# Patient Record
(demographics unavailable — no encounter records)

---

## 2024-12-19 NOTE — PHYSICAL EXAM
[No Respiratory Distress] : no respiratory distress  [No Accessory Muscle Use] : no accessory muscle use [Clear to Auscultation] : lungs were clear to auscultation bilaterally [Normal Rate] : normal rate  [Regular Rhythm] : with a regular rhythm [Soft] : abdomen soft [Non Tender] : non-tender [Non-distended] : non-distended [Coordination Grossly Intact] : coordination grossly intact [No Focal Deficits] : no focal deficits [Normal] : affect was normal and insight and judgment were intact

## 2024-12-19 NOTE — HISTORY OF PRESENT ILLNESS
[FreeTextEntry8] : 26M w/ Bipolar Disorder is coming in for BRBPR.   For the past few days, has had BRBPR, has noticed it when wiping on toilet paper. Has had hemorrhoids in the past, had sigmoidoscopy years ago that was reportedly fine.   Currently no dizziness, syncope or other ROS. Does have constipation  Had an episode of syncope years ago when straining on the toilet seat, had cardiac workup.  [Parent] : parent

## 2024-12-19 NOTE — ASSESSMENT
[FreeTextEntry1] : 26M w/ Bipolar Disorder is coming in for BRBPR.   #BRBPR -Likely 2/2 hemorrhoids  -CBC, CMP ordered -Start: Hydrocortisone (Perianal) 2.5 % External Cream; INSERT 1 APPLICATORFUL RECTALLY AT BEDTIME FOR 7 DAYS, THEN AS NEEDED -GI Consult   RTC depending on bw results

## 2024-12-23 NOTE — HISTORY OF PRESENT ILLNESS
[FreeTextEntry8] : 26M w/ Bipolar is coming in for cough.   6 days ago, has had cough. Some phlegm. Felt warm last night. Did not take temp. No runny nose or sore throat. Feels as if he is improving.

## 2024-12-23 NOTE — ASSESSMENT
[FreeTextEntry1] : 26M w/ Bipolar is coming in for cough.   #cough -likely viral, recommendd supportive treatment -RVP w/ COVID NAGA ordered -Benzonatate 100mg TID prn -If symptoms do not improve within a week and RVP negative, will consider abx   RTC if no improvement

## 2024-12-23 NOTE — PHYSICAL EXAM
[Normal] : affect was normal and insight and judgment were intact [Normal Oropharynx] : the oropharynx was normal [No Respiratory Distress] : no respiratory distress  [No Accessory Muscle Use] : no accessory muscle use [Clear to Auscultation] : lungs were clear to auscultation bilaterally [Normal Rate] : normal rate  [Regular Rhythm] : with a regular rhythm

## 2025-01-22 NOTE — REVIEW OF SYSTEMS
[Feeling Poorly] : not feeling poorly [Feeling Tired] : not feeling tired [Abdominal Pain] : no abdominal pain [Constipation] : no constipation [Diarrhea] : no diarrhea [Easy Bleeding] : no tendency for easy bleeding [Easy Bruising] : no tendency for easy bruising

## 2025-01-22 NOTE — PHYSICAL EXAM
[Alert] : alert [Healthy Appearing] : healthy appearing [No Acute Distress] : no acute distress [Oriented To Time, Place, And Person] : oriented to person, place, and time [Normal Affect] : the affect was normal [Normal Mood] : the mood was normal

## 2025-01-22 NOTE — HISTORY OF PRESENT ILLNESS
[FreeTextEntry1] : Ref MD: Andrew  26M hx bipolar here to discuss BRBPR x3 in December, on paper and in bowl with stool.  Hasn't happened this month.  No straining.  PCP did CBC, hgb 14.8 No abdominal pain Feels otherwise well. No anorectal pain on defecation   This happened 4y ago - saw Dr. Gadiel Sanchez - sigmoidoscopy - diagnosed with hemorrhoids and given medication  PMHx bipolar PSHx meniscus knee surgery All NKDA Meds cogentin, depakote, abilify SHx nonsmoker, no ETOH, works as a  for [employer] negative for CRC

## 2025-01-22 NOTE — ASSESSMENT
[FreeTextEntry1] : Likely outlet/hemorrhoidal given BRB only with stool, intermittent, normal hgb Given years since last exam, offered sigmoidoscopy - wishes to discuss with his family. Would do without anesthesia. Will call back to schedule if he decides to proceed

## 2025-07-17 NOTE — HEALTH RISK ASSESSMENT
[Never (0 pts)] : Never (0 points) [No] : In the past 12 months have you used drugs other than those required for medical reasons? No [0] : 2) Feeling down, depressed, or hopeless: Not at all (0) [PHQ-2 Negative - No further assessment needed] : PHQ-2 Negative - No further assessment needed [Never] : Never [Audit-CScore] : 0 [[id number]] : 0

## 2025-07-17 NOTE — ASSESSMENT
[Vaccines Reviewed] : Immunizations reviewed today. Please see immunization details in the vaccine log within the immunization flowsheet.  [FreeTextEntry1] : 27M w/ Bipolar Disorder, hemorrhoids is coming in for CPE.  #Bipolar Disorder -C/w Psychiatry follow up, currently stable on current meds  #BRBPR -Recommended patient schedule sigmoidoscopy as recommended by GI previously   #HCM -Labs as below -Recommended annual skin exam by Derm -Recommended dentist every 6 months -Recommended healthy diet and exercise   RTC in 1 year or sooner if clinically indicated

## 2025-07-17 NOTE — HISTORY OF PRESENT ILLNESS
[FreeTextEntry1] : 27M w/ Bipolar Disorder, hemorrhoids is coming in for CPE. [de-identified] : 27M w/ Bipolar Disorder, hemorrhoids is coming in for CPE.  Feels well, no acute complaints. Continues to have BRBPR in toilet bowl and on toilet paper. Did not have sigmoidoscopy yet.  Follows w/ Psychiatry for management of his Bipolar, sees every 3 months, Bipolar currently stable as per patient.